# Patient Record
(demographics unavailable — no encounter records)

---

## 2025-05-27 NOTE — ASSESSMENT
[FreeTextEntry1] : 10 yo girl with right ankle injury, 2 weeks ago, does not put weight  Today's visit included obtaining history from the child  parent due to the child's age, the child could not be considered a reliable historian, requiring parent to act as independent historian. No imaging and per mom she had twice Xray ( per mom no fracture) and she does not want another one Long discussion was done with family regarding  diagnosis, treatment options and prognosis recommendations: Weight bearing as tolerated with or without crutches PT for gait taring and ankle ROM Follow up in 2 weeks for reevaluation and Xray of the right ankle  restriction from activities for 3 weeks. note was provided. This plan was discussed with family. Family verbalizes understanding and agreement of plan. All questions and concerns were addressed today.

## 2025-05-27 NOTE — HISTORY OF PRESENT ILLNESS
[FreeTextEntry1] : Rosi  is a pleasant 10  yo girl who came today to my office with her mom for evaluation of right ankle/foot sprain. She sprained he right ankle/foot while  running  2 weeks ago . she immediate experienced pain with weight bearing and ankle ROM.  She was seen in urgent care, Xray was done twice per mom ( no imaging with mom)  - no fracture was diagnosed, but since she had a lot of pain she was treated with air cast and was instructed to follow with peds ortho, She is still having pain and refuse to bear weight  and therefore she came for evaluation of the above.

## 2025-05-27 NOTE — REVIEW OF SYSTEMS
[Change in Activity] : change in activity [Joint Pains] : arthralgias [Joint Swelling] : joint swelling  [Fever Above 102] : no fever [Itching] : no itching [Redness] : no redness [Sore Throat] : no sore throat [Wheezing] : no wheezing [Cough] : no cough

## 2025-05-27 NOTE — PHYSICAL EXAM
[FreeTextEntry1] : General: Patient is awake and alert and in no acute distress . oriented to person, place. well developed, well nourished, cooperative.   Skin: The skin is intact, warm, pink, and dry over the area examined.    Eyes: normal conjunctiva, normal eyelids and pupils were equal and round.   ENT: normal ears, normal nose and normal lips.  Cardiovascular: There is brisk capillary refill in the digits of the affected extremity. They are symmetric pulses in the bilateral upper and lower extremities, positive peripheral pulses, brisk capillary refill, but no peripheral edema.  Respiratory: The patient is in no apparent respiratory distress. They're taking full deep breaths without use of accessory muscles or evidence of audible wheezes or stridor without the use of a stethoscope, normal respiratory effort.   Neurological: 5/5 motor strength in the main muscle groups of bilateral lower extremities, sensory intact in bilateral lower extremities.   Musculoskeletal: Good posture. normal clinical alignment in upper and lower extremities. full range of motion in bilateral upper and lower extremities. normal clinical alignment of the spine. Refuse to bear weight  Right ankle : minimal swelling and tenderness above ATFL. no bony tenderness above malleoli,  base of 5 mts, or along the fibula and tibia shaft. NV intact able to DF/PF the ankle.